# Patient Record
Sex: FEMALE | Race: WHITE | HISPANIC OR LATINO | Employment: UNEMPLOYED | URBAN - METROPOLITAN AREA
[De-identification: names, ages, dates, MRNs, and addresses within clinical notes are randomized per-mention and may not be internally consistent; named-entity substitution may affect disease eponyms.]

---

## 2017-01-25 ENCOUNTER — HOSPITAL ENCOUNTER (OUTPATIENT)
Dept: PREADMISSION TESTING | Facility: AMBULARY SURGERY CENTER | Age: 32
Discharge: HOME | End: 2017-01-25
Attending: OBSTETRICS & GYNECOLOGY
Payer: MEDICAID

## 2017-01-25 ENCOUNTER — LAB VISIT (OUTPATIENT)
Dept: LAB | Facility: HOSPITAL | Age: 32
End: 2017-01-25
Attending: OBSTETRICS & GYNECOLOGY
Payer: MEDICAID

## 2017-01-25 ENCOUNTER — ANESTHESIA EVENT (OUTPATIENT)
Dept: SURGERY | Facility: AMBULARY SURGERY CENTER | Age: 32
End: 2017-01-25
Payer: MEDICAID

## 2017-01-25 DIAGNOSIS — O02.1 MISSED AB: ICD-10-CM

## 2017-01-25 DIAGNOSIS — O02.0 BLIGHTED OVUM: Primary | ICD-10-CM

## 2017-01-25 LAB
ABO + RH BLD: NORMAL
BLD GP AB SCN CELLS X3 SERPL QL: NORMAL

## 2017-01-25 PROCEDURE — 36415 COLL VENOUS BLD VENIPUNCTURE: CPT

## 2017-01-25 PROCEDURE — 86901 BLOOD TYPING SEROLOGIC RH(D): CPT

## 2017-01-25 PROCEDURE — 86900 BLOOD TYPING SEROLOGIC ABO: CPT

## 2017-01-25 NOTE — OR NURSING
Surgical consent, blood consent, anesthesia consent and pre op instructions provided in [facility]. Roommate, Tyler, present as

## 2017-01-25 NOTE — DISCHARGE INSTRUCTIONS
Instrucciones de doris para albin dilatación y legrado (D y L)  Goode médico le ha hecho un procedimiento llamado dilatación y legrado. Las razones por las que se realiza douglas procedimiento varían de albin persona a otra. La dilatación y legrado puede realizarse para controlar un sangrado uterino muy abundante, para encontrar la causa de un sangrado irregular, para realizar un aborto o para extraer tejido si ha tenido un aborto espontáneo.  Cuidados en la casa  · Tómese las cosas con calma y repose tranquilamente lyly 2 días.  · Puede reanudar juan actividades normales y regresar al trabajo al cabo de 48 horas.  · Siga albin dieta jalil.  · En ally necesario, tómese un analgésico sin receta para aliviar el dolor.  Percocet i tab given at 0900  · Recuerde que es normal que haya sangrado lyly albin semana después del procedimiento. La cantidad de mook debe ser similar a la que hay lyly albin menstruación normal.  · No levante nada que pese más de 10 libras (4.5 kg) lyly 1 semana después de la cirugía.  · No maneje un automóvil hasta que hayan transcurrido 24 horas desde el procedimiento.  · No tenga relaciones sexuales ni use tampones o duchas vaginales hasta que el médico le diga que puede hacerlo sin peligro. [place] suele tardar entre 2 y 6 semanas.  Visitas de control  · Programe albin visita de control según le indique el personal médico.  Cuándo debe llamar al médico  Llame a gooed médico de inmediato si nota cualquiera de estos síntomas:  · Sangrado que empapa más de albin toalla sanitaria en 1 hora  · Dolor abdominal grave  · Cólicos muy balaji  · Fiebre de más de 101.0°F (38.3°C)  · Escalofríos  · Secreción vaginal maloliente   © 1426-1763 [employer]. [address] [place] [address], [place] [address]. Todos los derechos reservados. Esta información no pretende sustituir la atención médica profesional. Sólo goode médico puede diagnosticar y tratar un problema de korina.        D

## 2017-01-26 ENCOUNTER — HOSPITAL ENCOUNTER (OUTPATIENT)
Facility: AMBULARY SURGERY CENTER | Age: 32
Discharge: HOME | End: 2017-01-26
Attending: OBSTETRICS & GYNECOLOGY | Admitting: OBSTETRICS & GYNECOLOGY
Payer: MEDICAID

## 2017-01-26 ENCOUNTER — ANESTHESIA (OUTPATIENT)
Dept: SURGERY | Facility: AMBULARY SURGERY CENTER | Age: 32
End: 2017-01-26
Payer: MEDICAID

## 2017-01-26 VITALS
WEIGHT: 210 LBS | RESPIRATION RATE: 18 BRPM | BODY MASS INDEX: 34.99 KG/M2 | OXYGEN SATURATION: 100 % | TEMPERATURE: 98 F | SYSTOLIC BLOOD PRESSURE: 113 MMHG | DIASTOLIC BLOOD PRESSURE: 55 MMHG | HEART RATE: 57 BPM | HEIGHT: 65 IN

## 2017-01-26 DIAGNOSIS — O02.0 BLIGHTED OVUM: ICD-10-CM

## 2017-01-26 PROCEDURE — D9220A PRA ANESTHESIA: Mod: ANES,,, | Performed by: ANESTHESIOLOGY

## 2017-01-26 PROCEDURE — D9220A PRA ANESTHESIA: Mod: CRNA,,, | Performed by: NURSE ANESTHETIST, CERTIFIED REGISTERED

## 2017-01-26 PROCEDURE — 88305 TISSUE EXAM BY PATHOLOGIST: CPT | Mod: 26,,, | Performed by: PATHOLOGY

## 2017-01-26 PROCEDURE — 88305 TISSUE EXAM BY PATHOLOGIST: CPT | Performed by: PATHOLOGY

## 2017-01-26 PROCEDURE — 59812 TREATMENT OF MISCARRIAGE: CPT | Performed by: OBSTETRICS & GYNECOLOGY

## 2017-01-26 RX ORDER — HYDROMORPHONE HYDROCHLORIDE 2 MG/ML
0.2 INJECTION, SOLUTION INTRAMUSCULAR; INTRAVENOUS; SUBCUTANEOUS EVERY 5 MIN PRN
Status: DISCONTINUED | OUTPATIENT
Start: 2017-01-26 | End: 2017-01-26 | Stop reason: HOSPADM

## 2017-01-26 RX ORDER — MEPERIDINE HYDROCHLORIDE 25 MG/ML
12.5 INJECTION INTRAMUSCULAR; INTRAVENOUS; SUBCUTANEOUS ONCE AS NEEDED
Status: DISCONTINUED | OUTPATIENT
Start: 2017-01-26 | End: 2017-01-26 | Stop reason: HOSPADM

## 2017-01-26 RX ORDER — SODIUM CHLORIDE, SODIUM LACTATE, POTASSIUM CHLORIDE, CALCIUM CHLORIDE 600; 310; 30; 20 MG/100ML; MG/100ML; MG/100ML; MG/100ML
INJECTION, SOLUTION INTRAVENOUS CONTINUOUS
Status: DISCONTINUED | OUTPATIENT
Start: 2017-01-26 | End: 2017-01-26 | Stop reason: HOSPADM

## 2017-01-26 RX ORDER — OXYCODONE AND ACETAMINOPHEN 5; 325 MG/1; MG/1
TABLET ORAL
Status: COMPLETED
Start: 2017-01-26 | End: 2017-01-26

## 2017-01-26 RX ORDER — ONDANSETRON 2 MG/ML
4 INJECTION INTRAMUSCULAR; INTRAVENOUS ONCE
Status: DISCONTINUED | OUTPATIENT
Start: 2017-01-26 | End: 2017-01-26 | Stop reason: HOSPADM

## 2017-01-26 RX ORDER — IBUPROFEN 800 MG/1
800 TABLET ORAL EVERY 6 HOURS PRN
Qty: 30 TABLET | Refills: 0 | Status: SHIPPED | OUTPATIENT
Start: 2017-01-26 | End: 2017-02-05

## 2017-01-26 RX ORDER — SODIUM CHLORIDE 0.9 % (FLUSH) 0.9 %
3 SYRINGE (ML) INJECTION
Status: DISCONTINUED | OUTPATIENT
Start: 2017-01-26 | End: 2017-01-26 | Stop reason: HOSPADM

## 2017-01-26 RX ORDER — OXYTOCIN 10 [USP'U]/ML
INJECTION, SOLUTION INTRAMUSCULAR; INTRAVENOUS
Status: DISCONTINUED
Start: 2017-01-26 | End: 2017-01-26 | Stop reason: HOSPADM

## 2017-01-26 RX ORDER — HYDROCODONE BITARTRATE AND ACETAMINOPHEN 5; 325 MG/1; MG/1
2 TABLET ORAL EVERY 4 HOURS PRN
Qty: 30 TABLET | Refills: 0 | Status: SHIPPED | OUTPATIENT
Start: 2017-01-26 | End: 2017-02-05

## 2017-01-26 RX ORDER — OXYTOCIN 10 [USP'U]/ML
INJECTION, SOLUTION INTRAMUSCULAR; INTRAVENOUS
Status: DISCONTINUED | OUTPATIENT
Start: 2017-01-26 | End: 2017-01-26

## 2017-01-26 RX ORDER — DEXAMETHASONE SODIUM PHOSPHATE 4 MG/ML
INJECTION, SOLUTION INTRA-ARTICULAR; INTRALESIONAL; INTRAMUSCULAR; INTRAVENOUS; SOFT TISSUE
Status: COMPLETED
Start: 2017-01-26 | End: 2017-01-26

## 2017-01-26 RX ORDER — FENTANYL CITRATE 50 UG/ML
25 INJECTION, SOLUTION INTRAMUSCULAR; INTRAVENOUS EVERY 5 MIN PRN
Status: DISCONTINUED | OUTPATIENT
Start: 2017-01-26 | End: 2017-01-26 | Stop reason: HOSPADM

## 2017-01-26 RX ORDER — LIDOCAINE HYDROCHLORIDE 10 MG/ML
1 INJECTION, SOLUTION EPIDURAL; INFILTRATION; INTRACAUDAL; PERINEURAL ONCE
Status: COMPLETED | OUTPATIENT
Start: 2017-01-26 | End: 2017-01-26

## 2017-01-26 RX ORDER — KETOROLAC TROMETHAMINE 30 MG/ML
INJECTION, SOLUTION INTRAMUSCULAR; INTRAVENOUS
Status: COMPLETED
Start: 2017-01-26 | End: 2017-01-26

## 2017-01-26 RX ORDER — METHYLERGONOVINE MALEATE 0.2 MG/1
0.2 TABLET ORAL 4 TIMES DAILY
Qty: 8 TABLET | Refills: 0 | Status: SHIPPED | OUTPATIENT
Start: 2017-01-26 | End: 2017-01-28

## 2017-01-26 RX ORDER — OXYCODONE HYDROCHLORIDE 5 MG/1
5 TABLET ORAL ONCE AS NEEDED
Status: DISCONTINUED | OUTPATIENT
Start: 2017-01-27 | End: 2017-01-26 | Stop reason: HOSPADM

## 2017-01-26 RX ORDER — SODIUM CHLORIDE, SODIUM LACTATE, POTASSIUM CHLORIDE, CALCIUM CHLORIDE 600; 310; 30; 20 MG/100ML; MG/100ML; MG/100ML; MG/100ML
75 INJECTION, SOLUTION INTRAVENOUS CONTINUOUS
Status: DISCONTINUED | OUTPATIENT
Start: 2017-01-26 | End: 2017-01-26 | Stop reason: HOSPADM

## 2017-01-26 RX ORDER — FENTANYL CITRATE 50 UG/ML
INJECTION, SOLUTION INTRAMUSCULAR; INTRAVENOUS
Status: COMPLETED
Start: 2017-01-26 | End: 2017-01-26

## 2017-01-26 RX ORDER — LIDOCAINE HYDROCHLORIDE 20 MG/ML
JELLY TOPICAL
Status: DISCONTINUED
Start: 2017-01-26 | End: 2017-01-26 | Stop reason: HOSPADM

## 2017-01-26 RX ORDER — ONDANSETRON 2 MG/ML
INJECTION INTRAMUSCULAR; INTRAVENOUS
Status: COMPLETED
Start: 2017-01-26 | End: 2017-01-26

## 2017-01-26 RX ORDER — MIDAZOLAM HYDROCHLORIDE 1 MG/ML
INJECTION INTRAMUSCULAR; INTRAVENOUS
Status: COMPLETED
Start: 2017-01-26 | End: 2017-01-26

## 2017-01-26 RX ORDER — OXYCODONE AND ACETAMINOPHEN 5; 325 MG/1; MG/1
1 TABLET ORAL ONCE
Status: COMPLETED | OUTPATIENT
Start: 2017-01-26 | End: 2017-01-26

## 2017-01-26 RX ORDER — PROPOFOL 10 MG/ML
INJECTION, EMULSION INTRAVENOUS
Status: COMPLETED
Start: 2017-01-26 | End: 2017-01-26

## 2017-01-26 RX ORDER — DIPHENHYDRAMINE HYDROCHLORIDE 50 MG/ML
25 INJECTION INTRAMUSCULAR; INTRAVENOUS EVERY 6 HOURS PRN
Status: DISCONTINUED | OUTPATIENT
Start: 2017-01-26 | End: 2017-01-26 | Stop reason: HOSPADM

## 2017-01-26 RX ORDER — LIDOCAINE HYDROCHLORIDE 20 MG/ML
INJECTION, SOLUTION EPIDURAL; INFILTRATION; INTRACAUDAL; PERINEURAL
Status: COMPLETED
Start: 2017-01-26 | End: 2017-01-26

## 2017-01-26 RX ADMIN — OXYCODONE AND ACETAMINOPHEN 1 TABLET: 5; 325 TABLET ORAL at 08:01

## 2017-01-26 RX ADMIN — FENTANYL CITRATE 50 MCG: 50 INJECTION, SOLUTION INTRAMUSCULAR; INTRAVENOUS at 07:01

## 2017-01-26 RX ADMIN — LIDOCAINE HYDROCHLORIDE 0.2 ML: 10 INJECTION, SOLUTION EPIDURAL; INFILTRATION; INTRACAUDAL; PERINEURAL at 07:01

## 2017-01-26 RX ADMIN — KETOROLAC TROMETHAMINE 30 MG: 30 INJECTION, SOLUTION INTRAMUSCULAR; INTRAVENOUS at 08:01

## 2017-01-26 RX ADMIN — OXYTOCIN 10 UNITS: 10 INJECTION, SOLUTION INTRAMUSCULAR; INTRAVENOUS at 08:01

## 2017-01-26 RX ADMIN — LIDOCAINE HYDROCHLORIDE 100 MG: 20 INJECTION, SOLUTION EPIDURAL; INFILTRATION; INTRACAUDAL; PERINEURAL at 07:01

## 2017-01-26 RX ADMIN — ONDANSETRON 4 MG: 2 INJECTION INTRAMUSCULAR; INTRAVENOUS at 08:01

## 2017-01-26 RX ADMIN — PROPOFOL 200 MG: 10 INJECTION, EMULSION INTRAVENOUS at 07:01

## 2017-01-26 RX ADMIN — SODIUM CHLORIDE, SODIUM LACTATE, POTASSIUM CHLORIDE, CALCIUM CHLORIDE: 600; 310; 30; 20 INJECTION, SOLUTION INTRAVENOUS at 07:01

## 2017-01-26 RX ADMIN — MIDAZOLAM HYDROCHLORIDE 2 MG: 1 INJECTION INTRAMUSCULAR; INTRAVENOUS at 07:01

## 2017-01-26 RX ADMIN — DEXAMETHASONE SODIUM PHOSPHATE 4 MG: 4 INJECTION, SOLUTION INTRA-ARTICULAR; INTRALESIONAL; INTRAMUSCULAR; INTRAVENOUS; SOFT TISSUE at 08:01

## 2017-01-26 RX ADMIN — FENTANYL CITRATE 50 MCG: 50 INJECTION, SOLUTION INTRAMUSCULAR; INTRAVENOUS at 08:01

## 2017-01-26 NOTE — OP NOTE
OPERATIVE REPORT    PREOPERATIVE DIAGNOSIS:  Incomplete , Blighted Ovum    POSTOPERATIVE DIAGNOSIS:  Incomplete , Blighted Ovum    PROCEDURE:  Suction Dilation and Curettage    SURGEON:  Conchita Rosas MD    ANESTHESIA:  General    SPECIMENS:  Products of conception    PROCEDURE IN DETAIL:  After induction of general anesthesia, the patient placed in dorsal lithotomy position and sterily prepped and draped in the usual fashion for a vaginal procedure.  A weighted speculum was placed in the vagina.  The anterior lip  of the cervix was grasped with a tenaculum.  Examination under anesthesia revealed the cervix to be not dilated.  The uterus was sounded to 8 cm.  Using the Lori dilators, the cervix was dilated to a # 8 Lori.  Suction curettage was then performed with a #7 millimeter curved curet until all tissue appeared to be removed.  This was confirmed with a sharp curettage of all quadrants and no further tissue was noted.   There was noted to be some bleeding on the inner posterior cervix which was controlled with 2.0 vicryl suture.  The tissue was sent to the lab for pathology.  No bleeding was noted at this time.  The weighted speculum and tenaculum were removed.  The patient was taken to the recovery room in good condition.    COMPLICATIONS:  None    CONDITION:  Stable to recovery

## 2017-01-26 NOTE — IP AVS SNAPSHOT
[facility] [place] Location  [address] [place] [address] [place] [address]           Instrucciones de [place] de Pacientes    Nuestro objetivo es que te prepara para el éxito. Purnima paquete incluye información sobre goode enfermedad, medicamentos y atención médica a domicilio. [place] le ayudará a cuidar y que no se enferman más y necesita volver al hospital.     Por favor, pregunte a goode enfermera si tiene alguna pregunta.       Hay muchos detalles a recordar cuando se prepara para salir del hospital. [place] es lo que necesita hacer:    1. [place] goode medicina. Si usted tiene albin receta, revise la lista de medicamentos en las siguientes páginas. Es posible que tenga nuevos medicamentos para recoger en la farmacia y otros que tendrá que dejar de vaughn. Revise las instrucciones sobre cómo y cuándo vaughn juan medicamentos. Consulte con el médico o el enfermeras si no está seguro de qué hacer.    2. Ir a juan citas de seguimiento. La información específica de seguimiento aparece en las siguientes páginas. Usted puede ser contactado por albin enfermera o proveedor clínico sobre las próximas citas. Estar seguro de que tiene todos los números de teléfono para comunicarse si es necesario. Por favor, póngase en contacto con la oficina de goode profesional médico si no puede hacer albin kyaw.     3. Esté atento a señales de advertencia. El médico o la enfermera le dará señales de alarma detallados que debe observar y cuándo llamar para la ayuda. Estas instrucciones también pueden incluir información educativa acerca de goode condición. Si usted experimenta cualquiera de las señales de advertencia para goode korina, llame a goode médico.           ** Verificar la lista de medicamentos es correcta y está actualizada. Llevar esto con usted en ally de emergencia. Si juan medicamentos garcia cambiado, por favor notifique a goode proveedor de atención médica.             Lista de medicamentos      EMPIECE a vaughn estos medicamentos         Additional Info                      hydrocodone-acetaminophen 5-325mg 5-325 mg per tablet   También conocido mayuri:  NORCO   Cantidad:  30 tablet   Recargas:  0   Dosis:  2 tablet    Instrucciones:  Take 2 tablets by mouth every 4 (four) hours as needed for Pain.     Begin Date    AM    Noon    PM    Bedtime       ibuprofen 800 MG tablet   También conocido mayuri:  ADVIL,MOTRIN   Cantidad:  30 tablet   Recargas:  0   Dosis:  800 mg    Instrucciones:  Take 1 tablet (800 mg total) by mouth every 6 (six) hours as needed for Pain.     Begin Date    AM    Noon    PM    Bedtime       methylergonovine 0.2 mg tablet   También conocido mayuri:  METHERGINE   Cantidad:  8 tablet   Recargas:  0   Dosis:  0.2 mg    Instrucciones:  Take 1 tablet (0.2 mg total) by mouth 4 (four) times daily.     Begin Date    AM    Noon    PM    Bedtime            Dónde puede recoger juan medicamentos      Puede obtener estos medicamentos en cualquier farmacia     Traiga albin receta en papel para cada oswald de estos medicamentos     hydrocodone-acetaminophen 5-325mg 5-325 mg per tablet    ibuprofen 800 MG tablet    methylergonovine 0.2 mg tablet                  Por favor traiga a todos las citas de seguimiento:    1. Albin copia de las instrucciones de doris.  2. Todos los medicamentos que está tomando douglas momento, en juan envases originales.  3. Identificación y tarjeta de seguro.    Por favor llegue 15 minutos antes de la hora de la kyaw.    Por favor llame con 24 horas de antelación si tiene que cambiar goode kyaw y / o tiempo.        Información de seguimiento     Realice un seguimiento con:  Conchita Rosas MD    Cuándo:  2/9/2017    Especialidad:  Obstetrics and Gynecology    Información de contacto:    [address] [facility]  [id number]  [place] [address]  [phone]          Instrucciones de doris     Órdenes [place]uras    Activity as tolerated     Call MD for:  difficulty breathing or increased cough     Call MD for:  increased confusion or weakness     Call MD  for:  persistent dizziness, light-headedness, or visual disturbances     Call MD for:  persistent nausea and vomiting or diarrhea     Call MD for:  redness, tenderness, or signs of infection (pain, swelling, redness, odor or green/yellow discharge around incision site)     Call MD for:  severe persistent headache     Call MD for:  severe uncontrolled pain     Call MD for:  temperature >100.4     Call MD for:  worsening rash     Diet general     Preguntas:    Total calories:      Fat restriction, if any:      Protein restriction, if any:      Na restriction, if any:      Fluid restriction:      Additional restrictions:      No dressing needed         Instrucciones a oswaldo de doris         Instrucciones de doris para albin dilatación y legrado (D y L)  [place] médico le ha hecho un procedimiento llamado dilatación y legrado. Las razones por las que se realiza douglas procedimiento varían de albin persona a otra. La dilatación y legrado puede realizarse para controlar un sangrado uterino muy abundante, para encontrar la causa de un sangrado irregular, para realizar un aborto o para extraer tejido si ha tenido un aborto espontáneo.  Cuidados en la casa  · Tómese las cosas con calma y repose tranquilamente lyly 2 días.  · Puede reanudar juan actividades normales y regresar al trabajo al cabo de 48 horas.  · Siga albin dieta jalil.  · En ally necesario, tómese un analgésico sin receta para aliviar el dolor.  Percocet i tab given at 0900  · Recuerde que es normal que haya sangrado lyly albin semana después del procedimiento. La cantidad de mook debe ser similar a la que hay lyly albin menstruación normal.  · No levante nada que pese más de 10 libras (4.5 kg) lyly 1 semana después de la cirugía.  · No maneje un automóvil hasta que hayan transcurrido 24 horas desde el procedimiento.  · No tenga relaciones sexuales ni use tampones o duchas vaginales hasta que el médico le diga que puede hacerlo sin peligro. [place] suele tardar entre 2 y  "6 semanas.  Visitas de control  · Programe albin visita de control según le indique el personal médico.  Cuándo debe llamar al médico  Llame a goode médico de inmediato si nota cualquiera de estos síntomas:  · Sangrado que empapa más de albin toalla sanitaria en 1 hora  · Dolor abdominal grave  · Cólicos muy balaji  · Fiebre de más de 101.0°F (38.3°C)  · Escalofríos  · Secreción vaginal maloliente   © [id number] [employer]. [address] [place] [address], [place] [address]. Todos los derechos reservados. Esta información no pretende sustituir la atención médica profesional. Sólo goode médico puede diagnosticar y tratar un problema de korina.        D                Información de Admisión     Fecha y hora Proveedor Departamento [facility]    1/26/2017  6:29 AM Conchita Rosas MD [facility] [id number]      Los proveedores de cuidado     Personal Médico Rol Especialidad Teléfono principal de la oficina    Conchita Rosas MD Attending Provider Obstetrics and Gynecology [phone]    Conchita Rosas MD Surgeon  Obstetrics and Gynecology [phone]      Che signos vitales kadi     PS Pulso Temperatura Resp [place] Peso    100/59 80 98.1 °F (36.7 °C) (Oral) 20 5' 5" (1.651 m) 95.3 kg (210 lb)    Ultima menstruación SpO2 BMI (IMC)             11/01/2016 (Exact Date) 98% 34.95 kg/m2         Recent Lab Values     No lab values to display.      Pending Labs     Order Current Status    Specimen to Pathology - Surgery Collected (01/26/17 0814)      Alergias     A partir del:  1/26/2017        No Known Allergies      [facility]     [facility] - 24/7    Si goode médico no le ha indicado a lo contrário, por favor contactar a [facility], nuestra línea de cuidado de enfermeras está disponible para asistirle 24/7.    Enfermeras registradas de [facility] pueden ayudarle a reservar albin kyaw, proveer educación para la korina, asesoría clínica, y otros servicios de asesoramiento.     Llame para douglas " servicio gratuito a [phone].        Language Assistance Services     ATTENTION: Language assistance services are available, free of charge. Please call [phone].      ATENCIÓN: Si habla español, tiene a goode disposición servicios gratuitos de asistencia lingüística. Llame al [phone].     CHÚ Ý: N?u b?n nói Ti?ng Vi?t, có các d?ch v? h? tr? ngôn ng? mi?n phí dành cho b?n. G?i s? [phone].        Registrarse para [facility]     La activación de goode cuenta [facility] es tan fácil mayuri 1-2-3!    1) Ir a [email or web address], seleccione Registrarse Ahora, meter el código de activación y goode fecha de nacimiento, y seleccione Próximo.    [id number]  Expires: 3/12/2017  8:27 AM      2) Crear un nombre de usuario y contraseña para usar cuando se visita [facility] en el futuro y selecciona albin pregunta de seguridad en ally de que pierda goode contraseña y seleccione Próximo.    3) Introduzca goode dirección de correo electrónico y alberto clic en Registrarse!    Información Adicional  Si tiene alguna pregunta, por favor, e-mail [email or web address] o llame al [phone] para hablar con nuestro personal. Recuerde, [facility] no debe ser usada para necesidades urgentes. En ally de emergencia médica, llame al 911.         [facility] cumple con las leyes federales aplicables de derechos civiles y no discrimina por motivos de samreen, color, origen nacional, edad, discapacidad, o sexo.                        [facility] [place] Location  [address]  [place] [address]           Patient Discharge Instructions     Our goal is to set you up for success. This packet includes information on your condition, medications, and your home care. It will help you to care for yourself so you don't get sicker and need to go back to the hospital.     Please ask your nurse if you have any questions.        There are many details to remember when preparing to leave the hospital. Here  is what you will need to do:    1. Take your medicine. If you are prescribed medications, review your Medication List in the following pages. You may have new medications to  at the pharmacy and others that you'll need to stop taking. Review the instructions for how and when to take your medications. Talk with your doctor or nurses if you are unsure of what to do.     2. Go to your follow-up appointments. Specific follow-up information is listed in the following pages. Your may be contacted by a transition nurse or clinical provider about future appointments. Be sure we have all of the phone numbers to reach you, if needed. Please contact your provider's office if you are unable to make an appointment.     3. Watch for warning signs. Your doctor or nurse will give you detailed warning signs to watch for and when to call for assistance. These instructions may also include educational information about your condition. If you experience any of warning signs to your health, call your doctor.           ** Verificar la lista de medicamentos es correcta y está actualizada. Llevar esto con usted en ally de emergencia. Si juan medicamentos garcia cambiado, por favor notifique a goode proveedor de atención médica.             Medication List      START taking these medications        Additional Info                      hydrocodone-acetaminophen 5-325mg 5-325 mg per tablet   Commonly known as:  NORCO   Quantity:  30 tablet   Refills:  0   Dose:  2 tablet    Instructions:  Take 2 tablets by mouth every 4 (four) hours as needed for Pain.     Begin Date    AM    Noon    PM    Bedtime       ibuprofen 800 MG tablet   Commonly known as:  ADVIL,MOTRIN   Quantity:  30 tablet   Refills:  0   Dose:  800 mg    Instructions:  Take 1 tablet (800 mg total) by mouth every 6 (six) hours as needed for Pain.     Begin Date    AM    Noon    PM    Bedtime       methylergonovine 0.2 mg tablet   Commonly known as:  METHERGINE   Quantity:  8 tablet    Refills:  0   Dose:  0.2 mg    Instructions:  Take 1 tablet (0.2 mg total) by mouth 4 (four) times daily.     Begin Date    AM    Noon    PM    Bedtime            Where to Get Your Medications      You can get these medications from any pharmacy     Bring a paper prescription for each of these medications     hydrocodone-acetaminophen 5-325mg 5-325 mg per tablet    ibuprofen 800 MG tablet    methylergonovine 0.2 mg tablet                  Por favor traiga a todos las citas de seguimiento:    1. Albin copia de las instrucciones de doris.  2. Todos los medicamentos que está tomando douglas momento, en juan envases originales.  3. Identificación y tarjeta de seguro.    Por favor llegue 15 minutos antes de la hora de la kyaw.    Por favor llame con 24 horas de antelación si tiene que cambiar goode kyaw y / o tiempo.        Follow-up Information     Follow up with Conchita Rosas MD In 2 weeks.    Specialty:  Obstetrics and Gynecology    Contact information:    [address] [address]  [phone]          Discharge Instructions     Future Orders    Activity as tolerated     Call MD for:  difficulty breathing or increased cough     Call MD for:  increased confusion or weakness     Call MD for:  persistent dizziness, light-headedness, or visual disturbances     Call MD for:  persistent nausea and vomiting or diarrhea     Call MD for:  redness, tenderness, or signs of infection (pain, swelling, redness, odor or green/yellow discharge around incision site)     Call MD for:  severe persistent headache     Call MD for:  severe uncontrolled pain     Call MD for:  temperature >100.4     Call MD for:  worsening rash     Diet general     Questions:    Total calories:      Fat restriction, if any:      Protein restriction, if any:      Na restriction, if any:      Fluid restriction:      Additional restrictions:      No dressing needed         Discharge Instructions         Instrucciones de doris para albin dilatación y  legrado (D y L)  Goode médico le ha hecho un procedimiento llamado dilatación y legrado. Las razones por las que se realiza douglas procedimiento varían de albin persona a otra. La dilatación y legrado puede realizarse para controlar un sangrado uterino muy abundante, para encontrar la causa de un sangrado irregular, para realizar un aborto o para extraer tejido si ha tenido un aborto espontáneo.  Cuidados en la casa  · Tómese las cosas con calma y repose tranquilamente lyly 2 días.  · Puede reanudar juan actividades normales y regresar al trabajo al cabo de 48 horas.  · Siga albin dieta jalil.  · En ally necesario, tómese un analgésico sin receta para aliviar el dolor.  Percocet i tab given at 0900  · Recuerde que es normal que haya sangrado lyly albin semana después del procedimiento. La cantidad de mook debe ser similar a la que hay lyly albin menstruación normal.  · No levante nada que pese más de 10 libras (4.5 kg) lyly 1 semana después de la cirugía.  · No maneje un automóvil hasta que hayan transcurrido 24 horas desde el procedimiento.  · No tenga relaciones sexuales ni use tampones o duchas vaginales hasta que el médico le diga que puede hacerlo sin peligro. [place] suele tardar entre 2 y 6 semanas.  Visitas de control  · Programe albin visita de control según le indique el personal médico.  Cuándo debe llamar al médico  Llame a goode médico de inmediato si nota cualquiera de estos síntomas:  · Sangrado que empapa más de albin toalla sanitaria en 1 hora  · Dolor abdominal grave  · Cólicos muy balaji  · Fiebre de más de 101.0°F (38.3°C)  · Escalofríos  · Secreción vaginal maloliente   © 1435-5858 [employer]. [address] [place] [address], [place] [address]. Todos los derechos reservados. Esta información no pretende sustituir la atención médica profesional. Sólo goode médico puede diagnosticar y tratar un problema de korina.        D                Admission Information     Date & Time Provider Department CSN     "1/26/2017  6:29 AM Conchita Rosas MD [facility] [id number]      Care Providers     Provider Role Specialty Primary office phone    Conchita Rosas MD Attending Provider Obstetrics and Gynecology [phone]    Conchita Rosas MD Surgeon  Obstetrics and Gynecology [phone]      Your Vitals Were     BP Pulse Temp Resp Height Weight    100/59 80 98.1 °F (36.7 °C) (Oral) 20 5' 5" (1.651 m) 95.3 kg (210 lb)    Last Period SpO2 BMI             11/01/2016 (Exact Date) 98% 34.95 kg/m2         Recent Lab Values     No lab values to display.      Pending Labs     Order Current Status    Specimen to Pathology - Surgery Collected (01/26/17 0814)      Allergies as of 1/26/2017     No Known Allergies      [facility] On Call     [facility] Nurse Care Line - 24/7 Assistance  Unless otherwise directed by your provider, please contact [facility], our nurse care line that is available for 24/7 assistance.     Registered nurses in the [facility] Center provide clinical advisement, health education, appointment booking, and other advisory services.  Call for this free service at [phone].        Language Assistance Services     ATTENTION: Language assistance services are available, free of charge. Please call [phone].      ATENCIÓN: Si habla español, tiene a goode disposición servicios gratuitos de asistencia lingüística. Llame al [phone].     CHÚ Ý: N?u b?n nói Ti?ng Vi?t, có các d?ch v? h? tr? ngôn ng? mi?n phí dành cho b?n. G?i s? [phone].        [facility] Sign-Up     Activating your [facility] account is as easy as 1-2-3!     1) Visit [email or web address], select Sign Up Now, enter this activation code and your date of birth, then select Next.  [email or web address]  Expires: 3/12/2017  8:27 AM      2) Create a username and password to use when you visit [facility] in the future and select a security question in case you lose your password and select Next.    3) Enter your " e-mail address and click Sign Up!    Additional Information  If you have questions, please e-mail [email or web address] or call [phone] to talk to our [facility] staff. Remember, [facility] is NOT to be used for urgent needs. For medical emergencies, dial 911.          [facility] complies with applicable Federal civil rights laws and does not discriminate on the basis of race, color, national origin, age, disability, or sex.

## 2017-01-26 NOTE — DISCHARGE SUMMARY
[facility]  Discharge Note  Short Stay    Admit Date: 1/26/2017    Discharge Date and Time: No discharge date for patient encounter.     Attending Physician: Conchita Rosas MD     Discharge Provider: Conchita Rosas    Diagnoses:  Active Hospital Problems    Diagnosis  POA    Blighted ovum [O02.0]  Yes      Resolved Hospital Problems    Diagnosis Date Resolved POA   No resolved problems to display.       Discharged Condition: good    Hospital Course: Patient was admitted for an outpatient procedure and tolerated the procedure well with no complications.    Final Diagnoses: Same as principal problem.    Disposition: Home or Self Care    Follow up/Patient Instructions:    Medications:  Reconciled Home Medications:   Current Discharge Medication List      START taking these medications    Details   hydrocodone-acetaminophen 5-325mg (NORCO) 5-325 mg per tablet Take 2 tablets by mouth every 4 (four) hours as needed for Pain.  Qty: 30 tablet, Refills: 0      ibuprofen (ADVIL,MOTRIN) 800 MG tablet Take 1 tablet (800 mg total) by mouth every 6 (six) hours as needed for Pain.  Qty: 30 tablet, Refills: 0      methylergonovine (METHERGINE) 0.2 mg tablet Take 1 tablet (0.2 mg total) by mouth 4 (four) times daily.  Qty: 8 tablet, Refills: 0             Discharge Procedure Orders  Diet general     Activity as tolerated     Call MD for:  temperature >100.4     Call MD for:  persistent nausea and vomiting or diarrhea     Call MD for:  severe uncontrolled pain     Call MD for:  redness, tenderness, or signs of infection (pain, swelling, redness, odor or green/yellow discharge around incision site)     Call MD for:  difficulty breathing or increased cough     Call MD for:  severe persistent headache     Call MD for:  worsening rash     Call MD for:  increased confusion or weakness     Call MD for:  persistent dizziness, light-headedness, or visual disturbances     No dressing needed       Follow-up Information      Follow up with Conchita Rosas MD In 2 weeks.    Specialty:  Obstetrics and Gynecology    Contact information:    [address]  [place] [id number] [phone]            Discharge Procedure Orders (must include Diet, Follow-up, Activity):    Discharge Procedure Orders (must include Diet, Follow-up, Activity)  Diet general     Activity as tolerated     Call MD for:  temperature >100.4     Call MD for:  persistent nausea and vomiting or diarrhea     Call MD for:  severe uncontrolled pain     Call MD for:  redness, tenderness, or signs of infection (pain, swelling, redness, odor or green/yellow discharge around incision site)     Call MD for:  difficulty breathing or increased cough     Call MD for:  severe persistent headache     Call MD for:  worsening rash     Call MD for:  increased confusion or weakness     Call MD for:  persistent dizziness, light-headedness, or visual disturbances     No dressing needed

## 2017-01-26 NOTE — ANESTHESIA POSTPROCEDURE EVALUATION
"Anesthesia Post Evaluation    Patient: Kinza Chiu    Procedure(s) Performed: Procedure(s) (LRB):  D & C (SUCTION) (N/A)    Final Anesthesia Type: general  Patient location during evaluation: PACU  Patient participation: Yes- Able to Participate  Level of consciousness: awake and alert and oriented  Post-procedure vital signs: reviewed and stable  Pain management: adequate  Airway patency: patent  PONV status at discharge: No PONV  Anesthetic complications: no      Cardiovascular status: blood pressure returned to baseline  Respiratory status: unassisted, spontaneous ventilation and room air  Hydration status: euvolemic  Follow-up not needed.        Visit Vitals    BP (!) 95/53    Pulse 90    Temp 36.7 °C (98.1 °F) (Oral)    Resp 20    Ht 5' 5" (1.651 m)    Wt 95.3 kg (210 lb)    LMP 11/01/2016 (Exact Date)    SpO2 (!) 94%    Breastfeeding No    BMI 34.95 kg/m2       Pain/Jaziel Score: Pain Assessment Performed: Yes (1/26/2017  6:41 AM)  Presence of Pain: denies (1/26/2017  6:41 AM)      "

## 2017-01-26 NOTE — TRANSFER OF CARE
"Anesthesia Transfer of Care Note    Patient: Kinza Chiu    Procedure(s) Performed: Procedure(s) (LRB):  D & C (SUCTION) (N/A)    Patient location: PACU    Anesthesia Type: general    Transport from OR: Transported from OR on room air with adequate spontaneous ventilation    Post pain: adequate analgesia    Post assessment: no apparent anesthetic complications    Post vital signs: stable    Level of consciousness: sedated and responds to stimulation    Nausea/Vomiting: no nausea/vomiting    Complications: none          Last vitals:   Visit Vitals    /61 (BP Location: Right arm, Patient Position: Lying, BP Method: Automatic)    Pulse 70    Temp 36.7 °C (98.1 °F) (Oral)    Resp 18    Ht 5' 5" (1.651 m)    Wt 95.3 kg (210 lb)    LMP 11/01/2016 (Exact Date)    SpO2 98%    Breastfeeding No    BMI 34.95 kg/m2     "

## 2017-01-26 NOTE — ANESTHESIA PREPROCEDURE EVALUATION
01/26/2017  Kinza Chiu is a 31 y.o., female.    [facility] Anesthesia Evaluation    I have reviewed the Patient Summary Reports.    I have reviewed the Nursing Notes.   I have reviewed the Medications.     Review of Systems  Anesthesia Hx:  No previous Anesthesia    Social:  Non-Smoker Speaks very little english.       Physical Exam  General:  Morbid Obesity    Airway/Jaw/Neck:  Airway Findings: Mouth Opening: Normal Tongue: Normal  General Airway Assessment: Adult, Good  Mallampati: II  Improves to II with phonation.  TM Distance: 4-6 cm      Dental:  Dental Findings: In tact   Chest/Lungs:  Chest/Lungs Findings: Clear to auscultation, Normal Respiratory Rate     Heart/Vascular:  Heart Findings: Rate: Normal  Rhythm: Regular Rhythm  Sounds: Normal  Heart murmur: negative       Mental Status:  Mental Status Findings:  Cooperative, Alert and Oriented         Anesthesia Plan  Type of Anesthesia, risks & benefits discussed:  Anesthesia Type:  general  Patient's Preference:   Intra-op Monitoring Plan:   Intra-op Monitoring Plan Comments:   Post Op Pain Control Plan:   Post Op Pain Control Plan Comments:   Induction:   IV  Beta Blocker:  Patient is not currently on a Beta-Blocker (No further documentation required).       Informed Consent: Patient understands risks and agrees with Anesthesia plan.  Questions answered. Anesthesia consent signed with patient.  ASA Score: 1     Day of Surgery Review of History & Physical: I have interviewed and examined the patient. I have reviewed the patient's H&P dated:  There are no significant changes.          Ready For Surgery From Anesthesia Perspective.

## (undated) DEVICE — PAD SANITARY OB STERILE